# Patient Record
Sex: MALE | Race: OTHER | Employment: UNEMPLOYED | URBAN - METROPOLITAN AREA
[De-identification: names, ages, dates, MRNs, and addresses within clinical notes are randomized per-mention and may not be internally consistent; named-entity substitution may affect disease eponyms.]

---

## 2023-02-06 ENCOUNTER — HOSPITAL ENCOUNTER (EMERGENCY)
Facility: HOSPITAL | Age: 53
Discharge: HOME | End: 2023-02-06
Attending: EMERGENCY MEDICINE

## 2023-02-06 ENCOUNTER — APPOINTMENT (OUTPATIENT)
Dept: CT IMAGING | Facility: HOSPITAL | Age: 53
End: 2023-02-06
Attending: EMERGENCY MEDICINE

## 2023-02-06 ENCOUNTER — APPOINTMENT (OUTPATIENT)
Dept: GENERAL RADIOLOGY | Facility: HOSPITAL | Age: 53
End: 2023-02-06
Attending: EMERGENCY MEDICINE

## 2023-02-06 VITALS
BODY MASS INDEX: 34.7 KG/M2 | HEART RATE: 56 BPM | TEMPERATURE: 98 F | SYSTOLIC BLOOD PRESSURE: 130 MMHG | RESPIRATION RATE: 14 BRPM | DIASTOLIC BLOOD PRESSURE: 90 MMHG | OXYGEN SATURATION: 98 % | WEIGHT: 285 LBS | HEIGHT: 76 IN

## 2023-02-06 DIAGNOSIS — R07.9 CHEST PAIN OF UNCERTAIN ETIOLOGY: Primary | ICD-10-CM

## 2023-02-06 LAB
ALBUMIN SERPL-MCNC: 3.5 G/DL (ref 3.4–5)
ALBUMIN/GLOB SERPL: 0.9 {RATIO} (ref 1–2)
ALP LIVER SERPL-CCNC: 69 U/L
ALT SERPL-CCNC: 35 U/L
ANION GAP SERPL CALC-SCNC: 7 MMOL/L (ref 0–18)
AST SERPL-CCNC: 26 U/L (ref 15–37)
ATRIAL RATE: 52 BPM
ATRIAL RATE: 68 BPM
BASOPHILS # BLD AUTO: 0.04 X10(3) UL (ref 0–0.2)
BASOPHILS NFR BLD AUTO: 0.6 %
BILIRUB SERPL-MCNC: 0.4 MG/DL (ref 0.1–2)
BUN BLD-MCNC: 19 MG/DL (ref 7–18)
BUN/CREAT SERPL: 19.6 (ref 10–20)
CALCIUM BLD-MCNC: 9.7 MG/DL (ref 8.5–10.1)
CHLORIDE SERPL-SCNC: 105 MMOL/L (ref 98–112)
CO2 SERPL-SCNC: 27 MMOL/L (ref 21–32)
CREAT BLD-MCNC: 0.97 MG/DL
DEPRECATED RDW RBC AUTO: 41.7 FL (ref 35.1–46.3)
EOSINOPHIL # BLD AUTO: 0.16 X10(3) UL (ref 0–0.7)
EOSINOPHIL NFR BLD AUTO: 2.5 %
ERYTHROCYTE [DISTWIDTH] IN BLOOD BY AUTOMATED COUNT: 12.5 % (ref 11–15)
GFR SERPLBLD BASED ON 1.73 SQ M-ARVRAT: 95 ML/MIN/1.73M2 (ref 60–?)
GLOBULIN PLAS-MCNC: 4.1 G/DL (ref 2.8–4.4)
GLUCOSE BLD-MCNC: 114 MG/DL (ref 70–99)
HCT VFR BLD AUTO: 43.1 %
HGB BLD-MCNC: 14.3 G/DL
IMM GRANULOCYTES # BLD AUTO: 0.02 X10(3) UL (ref 0–1)
IMM GRANULOCYTES NFR BLD: 0.3 %
LYMPHOCYTES # BLD AUTO: 1.71 X10(3) UL (ref 1–4)
LYMPHOCYTES NFR BLD AUTO: 26.3 %
MCH RBC QN AUTO: 29.7 PG (ref 26–34)
MCHC RBC AUTO-ENTMCNC: 33.2 G/DL (ref 31–37)
MCV RBC AUTO: 89.4 FL
MONOCYTES # BLD AUTO: 0.57 X10(3) UL (ref 0.1–1)
MONOCYTES NFR BLD AUTO: 8.8 %
NEUTROPHILS # BLD AUTO: 4.01 X10 (3) UL (ref 1.5–7.7)
NEUTROPHILS # BLD AUTO: 4.01 X10(3) UL (ref 1.5–7.7)
NEUTROPHILS NFR BLD AUTO: 61.5 %
OSMOLALITY SERPL CALC.SUM OF ELEC: 291 MOSM/KG (ref 275–295)
P AXIS: 20 DEGREES
P AXIS: 28 DEGREES
P-R INTERVAL: 166 MS
P-R INTERVAL: 174 MS
PLATELET # BLD AUTO: 289 10(3)UL (ref 150–450)
POTASSIUM SERPL-SCNC: 4.2 MMOL/L (ref 3.5–5.1)
PROT SERPL-MCNC: 7.6 G/DL (ref 6.4–8.2)
Q-T INTERVAL: 380 MS
Q-T INTERVAL: 418 MS
QRS DURATION: 80 MS
QRS DURATION: 80 MS
QTC CALCULATION (BEZET): 388 MS
QTC CALCULATION (BEZET): 404 MS
R AXIS: 25 DEGREES
R AXIS: 50 DEGREES
RBC # BLD AUTO: 4.82 X10(6)UL
SODIUM SERPL-SCNC: 139 MMOL/L (ref 136–145)
T AXIS: 38 DEGREES
T AXIS: 4 DEGREES
TROPONIN I HIGH SENSITIVITY: 6 NG/L
TROPONIN I HIGH SENSITIVITY: 6 NG/L
VENTRICULAR RATE: 52 BPM
VENTRICULAR RATE: 68 BPM
WBC # BLD AUTO: 6.5 X10(3) UL (ref 4–11)

## 2023-02-06 PROCEDURE — 93010 ELECTROCARDIOGRAM REPORT: CPT

## 2023-02-06 PROCEDURE — 36415 COLL VENOUS BLD VENIPUNCTURE: CPT

## 2023-02-06 PROCEDURE — 84484 ASSAY OF TROPONIN QUANT: CPT | Performed by: EMERGENCY MEDICINE

## 2023-02-06 PROCEDURE — 71045 X-RAY EXAM CHEST 1 VIEW: CPT | Performed by: EMERGENCY MEDICINE

## 2023-02-06 PROCEDURE — 85025 COMPLETE CBC W/AUTO DIFF WBC: CPT | Performed by: EMERGENCY MEDICINE

## 2023-02-06 PROCEDURE — 99285 EMERGENCY DEPT VISIT HI MDM: CPT

## 2023-02-06 PROCEDURE — 80053 COMPREHEN METABOLIC PANEL: CPT | Performed by: EMERGENCY MEDICINE

## 2023-02-06 PROCEDURE — 75574 CT ANGIO HRT W/3D IMAGE: CPT | Performed by: EMERGENCY MEDICINE

## 2023-02-06 PROCEDURE — 93005 ELECTROCARDIOGRAM TRACING: CPT

## 2023-02-06 RX ORDER — DILTIAZEM HYDROCHLORIDE 5 MG/ML
5 INJECTION INTRAVENOUS SEE ADMIN INSTRUCTIONS
Status: DISCONTINUED | OUTPATIENT
Start: 2023-02-06 | End: 2023-02-06

## 2023-02-06 RX ORDER — METOPROLOL TARTRATE 50 MG/1
50 TABLET, FILM COATED ORAL ONCE AS NEEDED
Status: DISCONTINUED | OUTPATIENT
Start: 2023-02-06 | End: 2023-02-06

## 2023-02-06 RX ORDER — METOPROLOL TARTRATE 5 MG/5ML
INJECTION INTRAVENOUS
Status: COMPLETED
Start: 2023-02-06 | End: 2023-02-06

## 2023-02-06 RX ORDER — METOPROLOL TARTRATE 50 MG/1
100 TABLET, FILM COATED ORAL ONCE AS NEEDED
Status: DISCONTINUED | OUTPATIENT
Start: 2023-02-06 | End: 2023-02-06

## 2023-02-06 RX ORDER — NITROGLYCERIN 0.4 MG/1
0.4 TABLET SUBLINGUAL ONCE
Status: DISCONTINUED | OUTPATIENT
Start: 2023-02-06 | End: 2023-02-06

## 2023-02-06 RX ORDER — ALPRAZOLAM 0.25 MG/1
0.25 TABLET ORAL
Status: DISCONTINUED | OUTPATIENT
Start: 2023-02-06 | End: 2023-02-06

## 2023-02-06 RX ORDER — METOPROLOL TARTRATE 50 MG/1
100 TABLET, FILM COATED ORAL ONCE AS NEEDED
Status: COMPLETED | OUTPATIENT
Start: 2023-02-06 | End: 2023-02-06

## 2023-02-06 RX ORDER — METOPROLOL TARTRATE 5 MG/5ML
5 INJECTION INTRAVENOUS SEE ADMIN INSTRUCTIONS
Status: DISCONTINUED | OUTPATIENT
Start: 2023-02-06 | End: 2023-02-06

## 2023-02-06 RX ORDER — METOPROLOL TARTRATE 50 MG/1
50 TABLET, FILM COATED ORAL ONCE AS NEEDED
Status: COMPLETED | OUTPATIENT
Start: 2023-02-06 | End: 2023-02-06

## 2023-02-06 NOTE — ED INITIAL ASSESSMENT (HPI)
Pt with significant PMH came in for left sided chest pain radiating to left arm started last night. Took Aspirin 81 mgs 6 tabs this morning. A/Ox  4, breathing unlabored.

## 2023-02-06 NOTE — IMAGING NOTE
PT TO CT TABLE AT 1330    HX TAKEN : CHEST PAIN    1336: BASELINE VITAL SIGNS   HR 70 /98 BMI 34  LB    PT CONSENTED AT 4501    CTA ORDERED BY DR BRENNAN. WAS PT GIVEN CTA  PREMEDS: YES- MEDS GIVEN IN ER:     1220: METOPROLOL PO GIVEN 50 MILLIGRAMS IN ED    18 GAUGE IV STARTED IN ED. LABS COMPLETED:  GFR = 95   CREATINE = 0.97    NITROGLYCERIN 0.4 MILLIGRAMS SUBLINGUAL GIVEN AT 1341    CALCIUM SCORE COMPLETED AT 1344    1347: HR 66- 5 MG METOPROLOL IVP GIVEN PER PROTOCOL    INJECTION STARTED AT 1349 HR 47 DURING SCAN PROCEDURE COMPLETE    1353: POST SCAN VS HR 57 /71     1355: PT AMB BACK TO CART AND PLACED BACK ON ED MONITOR AND BROUGHT BACK TO ED ROOM BY CART BY CT TECH.

## 2023-02-06 NOTE — ED QUICK NOTES
Pt ambulatory to ED-31 from triage, A&O x 4. Pt here w/ c/o: Intermittent L sided chest pain, describes it as a \"squeezing pain. \"  Pt reporting pain has been present over the past month, but significantly worse starting last night.  (+) SOB & dizziness when pain occurs. Pt in NAD, speaking full, complete sentences w/ out any apparent difficulty/distress. Pt endorsing he took 6 baby ASA PTA. Pt denies any current heart problems, reports he has seen a cardiologist in the past d/t palpitations, but has not been formally dx w/ any type of arrhythmia. Pt connected to cont. ECG, pulse ox & BP. Labs collected & labeled @ bedside using 2 pt identifiers & sent to lab per orders.

## (undated) NOTE — LETTER
No information on file. Consent for Coronary CT Angiography    Date of Procedure: 2/6/23   on [address] [place] [address] doctor has recommended that you have a Coronary CT Angiography procedure. Coronary CT Angiography is a diagnostic procedure using computed tomography to scan the chest and coronary arteries. It is a non-invasive technique that allows clear visualization of narrowed and blocked arteries. Blockage in these arteries may lead to heart attack or stroke. You will lie on a table that slides slowly into a large circular opening called the CT gantry. The procedure will be performed by the CT Staff, including a nurse, a technologist, and a patient assistant. The staff will be with you throughout the entire procedure to explain each step, make you as comfortable as possible, and answer all of your questions. The staff will take a series of images of your heart and chest to help define the area to be imaged. You will be asked to hold your breath for a short time as each series of images is performed and acquired. You may receive medication called a beta blocker that will slow your hear rate down. This is needed so we can obtain better images of your heart. You may also receive a nitroglycerine spray under your tongue. This medication is given to dilate the arteries for better picture quality. The nitroglycerine may cause a drop in blood pressure. During the procedure you will receive an injection of a contrast material, which assists the physician in highlighting the anatomy of your heart. You may experience a warm sensation through your body and a metallic taste in your mouth. In rare circumstances, a rash and/or hives may occur. It's very important to inform your care giver of any changes you may feel or experience. The medication and the contrast material used as part of your procedure are all deemed very safe, however there is always an element of risk to a patient when taking medication. Allergic reactions to medication can range from very minor to very serious, leading to a life threatening situation or even death. Please be sure to communicate any allergy you may have to your caregiver immediately. The information that is obtained during your testing will be treated as privileged and confidential. The information obtained will be given to your doctor and may be used for insurance purposes or to track and trend data as part of  with your privacy retained. I, Dory Pak, have read and understand the above information. I voluntarily agree and consent to have the Coronary CT Angiography (CTA) Exam. Furthermore, no guarantees or assurances have been given by anyone as to the results that may be obtained from this procedure. Any questions that may have occurred to me have been answered to my satisfaction.     Signature of Patient: __________________________Date: ___________Time: _______      Patient Name: Dory Pak    : 1970      Printed: 2023      Medical Record #: [id number]